# Patient Record
Sex: MALE | Race: WHITE | NOT HISPANIC OR LATINO | Employment: FULL TIME | URBAN - METROPOLITAN AREA
[De-identification: names, ages, dates, MRNs, and addresses within clinical notes are randomized per-mention and may not be internally consistent; named-entity substitution may affect disease eponyms.]

---

## 2023-02-08 PROBLEM — E78.5 HYPERLIPIDEMIA: Status: ACTIVE | Noted: 2023-02-08

## 2023-02-08 PROBLEM — E55.9 VITAMIN D DEFICIENCY: Status: ACTIVE | Noted: 2023-02-08

## 2023-02-08 PROBLEM — E11.9 TYPE 2 DIABETES MELLITUS (MULTI): Status: ACTIVE | Noted: 2023-02-08

## 2023-02-08 PROBLEM — E66.9 MILDLY OBESE: Status: ACTIVE | Noted: 2023-02-08

## 2023-02-08 PROBLEM — E78.1 ESSENTIAL HYPERTRIGLYCERIDEMIA: Status: ACTIVE | Noted: 2023-02-08

## 2023-02-08 PROBLEM — E83.39 HYPOPHOSPHATASIA: Status: ACTIVE | Noted: 2023-02-08

## 2023-02-08 PROBLEM — E03.9 HYPOTHYROIDISM: Status: ACTIVE | Noted: 2023-02-08

## 2023-02-08 PROBLEM — M67.449 MUCOUS CYST OF FINGER: Status: ACTIVE | Noted: 2023-02-08

## 2023-02-08 PROBLEM — E66.01 CLASS 2 SEVERE OBESITY WITH BODY MASS INDEX (BMI) OF 35 TO 39.9 WITH SERIOUS COMORBIDITY (MULTI): Status: ACTIVE | Noted: 2023-02-08

## 2023-02-08 RX ORDER — DAPAGLIFLOZIN 5 MG/1
5 TABLET, FILM COATED ORAL DAILY
COMMUNITY
End: 2023-03-21

## 2023-02-08 RX ORDER — METFORMIN HYDROCHLORIDE EXTENDED-RELEASE TABLETS 1000 MG/1
1000 TABLET, FILM COATED, EXTENDED RELEASE ORAL 2 TIMES DAILY
COMMUNITY

## 2023-02-08 RX ORDER — FENOFIBRATE 160 MG/1
160 TABLET ORAL DAILY
COMMUNITY
End: 2023-03-21

## 2023-02-08 RX ORDER — LEVOTHYROXINE SODIUM 100 UG/1
1 TABLET ORAL DAILY
COMMUNITY
Start: 2022-05-17 | End: 2023-03-21

## 2023-02-08 RX ORDER — CHOLECALCIFEROL (VITAMIN D3) 125 MCG
1 CAPSULE ORAL DAILY
COMMUNITY
Start: 2021-05-25

## 2023-02-08 RX ORDER — ATORVASTATIN CALCIUM 80 MG/1
80 TABLET, FILM COATED ORAL DAILY
COMMUNITY

## 2023-03-14 LAB
ALANINE AMINOTRANSFERASE (SGPT) (U/L) IN SER/PLAS: 31 U/L (ref 10–52)
ALBUMIN (G/DL) IN SER/PLAS: 4.3 G/DL (ref 3.4–5)
ALKALINE PHOSPHATASE (U/L) IN SER/PLAS: 24 U/L (ref 33–120)
ANION GAP IN SER/PLAS: 13 MMOL/L (ref 10–20)
ASPARTATE AMINOTRANSFERASE (SGOT) (U/L) IN SER/PLAS: 18 U/L (ref 9–39)
BILIRUBIN TOTAL (MG/DL) IN SER/PLAS: 0.5 MG/DL (ref 0–1.2)
CALCIUM (MG/DL) IN SER/PLAS: 9.2 MG/DL (ref 8.6–10.3)
CARBON DIOXIDE, TOTAL (MMOL/L) IN SER/PLAS: 25 MMOL/L (ref 21–32)
CHLORIDE (MMOL/L) IN SER/PLAS: 102 MMOL/L (ref 98–107)
CHOLESTEROL (MG/DL) IN SER/PLAS: 177 MG/DL (ref 0–199)
CHOLESTEROL IN HDL (MG/DL) IN SER/PLAS: 30.8 MG/DL
CHOLESTEROL/HDL RATIO: 5.7
CREATININE (MG/DL) IN SER/PLAS: 0.97 MG/DL (ref 0.5–1.3)
ESTIMATED AVERAGE GLUCOSE FOR HBA1C: 280 MG/DL
GFR MALE: >90 ML/MIN/1.73M2
GLUCOSE (MG/DL) IN SER/PLAS: 342 MG/DL (ref 74–99)
HEMOGLOBIN A1C/HEMOGLOBIN TOTAL IN BLOOD: 11.4 %
LDL: ABNORMAL MG/DL (ref 0–99)
POTASSIUM (MMOL/L) IN SER/PLAS: 4.1 MMOL/L (ref 3.5–5.3)
PROTEIN TOTAL: 7.2 G/DL (ref 6.4–8.2)
SODIUM (MMOL/L) IN SER/PLAS: 136 MMOL/L (ref 136–145)
THYROTROPIN (MIU/L) IN SER/PLAS BY DETECTION LIMIT <= 0.05 MIU/L: 11.08 MIU/L (ref 0.44–3.98)
THYROXINE (T4) FREE (NG/DL) IN SER/PLAS: 0.75 NG/DL (ref 0.61–1.12)
TRIGLYCERIDE (MG/DL) IN SER/PLAS: 608 MG/DL (ref 0–149)
UREA NITROGEN (MG/DL) IN SER/PLAS: 23 MG/DL (ref 6–23)
VLDL: ABNORMAL MG/DL (ref 0–40)

## 2023-03-21 ENCOUNTER — OFFICE VISIT (OUTPATIENT)
Dept: PRIMARY CARE | Facility: CLINIC | Age: 53
End: 2023-03-21
Payer: COMMERCIAL

## 2023-03-21 VITALS
RESPIRATION RATE: 14 BRPM | SYSTOLIC BLOOD PRESSURE: 148 MMHG | HEIGHT: 74 IN | HEART RATE: 82 BPM | WEIGHT: 263 LBS | BODY MASS INDEX: 33.75 KG/M2 | TEMPERATURE: 96.8 F | OXYGEN SATURATION: 96 % | DIASTOLIC BLOOD PRESSURE: 100 MMHG

## 2023-03-21 DIAGNOSIS — E55.9 VITAMIN D DEFICIENCY: ICD-10-CM

## 2023-03-21 DIAGNOSIS — E78.2 MIXED HYPERLIPIDEMIA: ICD-10-CM

## 2023-03-21 DIAGNOSIS — E03.9 HYPOTHYROIDISM, UNSPECIFIED TYPE: ICD-10-CM

## 2023-03-21 DIAGNOSIS — I10 BENIGN ESSENTIAL HYPERTENSION: ICD-10-CM

## 2023-03-21 DIAGNOSIS — E78.1 ESSENTIAL HYPERTRIGLYCERIDEMIA: ICD-10-CM

## 2023-03-21 DIAGNOSIS — E11.9 TYPE 2 DIABETES MELLITUS WITHOUT COMPLICATION, WITHOUT LONG-TERM CURRENT USE OF INSULIN (MULTI): Primary | ICD-10-CM

## 2023-03-21 DIAGNOSIS — Z12.5 SCREENING FOR PROSTATE CANCER: ICD-10-CM

## 2023-03-21 PROBLEM — E66.9 MILD OBESITY: Status: ACTIVE | Noted: 2023-03-21

## 2023-03-21 PROBLEM — E66.01 CLASS 2 SEVERE OBESITY WITH BODY MASS INDEX (BMI) OF 35 TO 39.9 WITH SERIOUS COMORBIDITY (MULTI): Status: RESOLVED | Noted: 2023-02-08 | Resolved: 2023-03-21

## 2023-03-21 PROCEDURE — 1036F TOBACCO NON-USER: CPT | Performed by: FAMILY MEDICINE

## 2023-03-21 PROCEDURE — 3008F BODY MASS INDEX DOCD: CPT | Performed by: FAMILY MEDICINE

## 2023-03-21 PROCEDURE — 4010F ACE/ARB THERAPY RXD/TAKEN: CPT | Performed by: FAMILY MEDICINE

## 2023-03-21 PROCEDURE — 99214 OFFICE O/P EST MOD 30 MIN: CPT | Performed by: FAMILY MEDICINE

## 2023-03-21 PROCEDURE — 3077F SYST BP >= 140 MM HG: CPT | Performed by: FAMILY MEDICINE

## 2023-03-21 PROCEDURE — 3046F HEMOGLOBIN A1C LEVEL >9.0%: CPT | Performed by: FAMILY MEDICINE

## 2023-03-21 PROCEDURE — 3080F DIAST BP >= 90 MM HG: CPT | Performed by: FAMILY MEDICINE

## 2023-03-21 RX ORDER — FENOFIBRATE 200 MG/1
200 CAPSULE ORAL
Qty: 30 CAPSULE | Refills: 11 | Status: SHIPPED | OUTPATIENT
Start: 2023-03-21 | End: 2024-03-20

## 2023-03-21 RX ORDER — LISINOPRIL 10 MG/1
10 TABLET ORAL DAILY
Qty: 30 TABLET | Refills: 3 | Status: SHIPPED | OUTPATIENT
Start: 2023-03-21 | End: 2023-07-19

## 2023-03-21 RX ORDER — DAPAGLIFLOZIN 10 MG/1
10 TABLET, FILM COATED ORAL DAILY
Qty: 30 TABLET | Refills: 11 | Status: SHIPPED | OUTPATIENT
Start: 2023-03-21 | End: 2024-03-20

## 2023-03-21 RX ORDER — GLIPIZIDE 5 MG/1
5 TABLET ORAL DAILY
Qty: 30 TABLET | Refills: 3 | Status: SHIPPED | OUTPATIENT
Start: 2023-03-21 | End: 2023-07-19

## 2023-03-21 RX ORDER — LEVOTHYROXINE SODIUM 125 UG/1
125 TABLET ORAL DAILY
Qty: 30 TABLET | Refills: 3 | Status: SHIPPED | OUTPATIENT
Start: 2023-03-21 | End: 2023-07-19

## 2023-03-21 ASSESSMENT — ENCOUNTER SYMPTOMS
CHEST TIGHTNESS: 0
PALPITATIONS: 0
SHORTNESS OF BREATH: 0
FEVER: 0
ARTHRALGIAS: 0
ABDOMINAL PAIN: 0
CHILLS: 0
CONFUSION: 0

## 2023-03-21 NOTE — ASSESSMENT & PLAN NOTE
BP is not to goal start lisinopril 10 mg daily return in approximately 6 weeks for blood pressure recheck

## 2023-03-21 NOTE — ASSESSMENT & PLAN NOTE
Triglycerides are drifting back up now at 608.  Discontinue fenofibrate 160 mg we will increase this to fenofibrate 200 mg once a day and we reviewed lifestyle modifications

## 2023-03-21 NOTE — ASSESSMENT & PLAN NOTE
>>ASSESSMENT AND PLAN FOR MIXED HYPERLIPIDEMIA WRITTEN ON 3/21/2023  7:38 PM BY NEHA DIXON MD    Total cholesterol is under 200 LDL cannot be calculated due to abnormally high triglycerides.  Most recent LDL was below 100 and in the 70s.  We will continue atorvastatin 80 mg a day for now.  Given his hyperlipidemia and hypertriglyceridemia history along with multiple other cardiovascular risk factors we discussed further cardiac evaluation patient agreed to do a CT calcium scoring test further plans pending those results.    >>ASSESSMENT AND PLAN FOR ESSENTIAL HYPERTRIGLYCERIDEMIA WRITTEN ON 3/21/2023  7:36 PM BY NEHA DIXON MD    Triglycerides are drifting back up now at 608.  Discontinue fenofibrate 160 mg we will increase this to fenofibrate 200 mg once a day and we reviewed lifestyle modifications

## 2023-03-21 NOTE — PROGRESS NOTES
"Subjective   Patient ID: Mohan Dillon is a 52 y.o. male who presents for Follow-up (6 week).    HPI patient today for follow-up of ongoing healthcare issues and review of recent lab work.  Patient states he has been taking his medicines consistently and trying to follow a proper diet.    Review of Systems   Constitutional:  Negative for chills and fever.   HENT:  Negative for congestion and ear pain.    Eyes:  Negative for visual disturbance.   Respiratory:  Negative for chest tightness and shortness of breath.    Cardiovascular:  Negative for chest pain and palpitations.   Gastrointestinal:  Negative for abdominal pain.   Musculoskeletal:  Negative for arthralgias.   Skin:  Negative for pallor.   Psychiatric/Behavioral:  Negative for confusion.        Objective   BP (!) 148/100 (BP Location: Right arm, Patient Position: Sitting)   Pulse 82   Temp 36 °C (96.8 °F)   Resp 14   Ht 1.88 m (6' 2\")   Wt 119 kg (263 lb)   SpO2 96%   BMI 33.77 kg/m²     Physical Exam  Vitals and nursing note reviewed.   Constitutional:       General: He is not in acute distress.     Appearance: Normal appearance. He is not ill-appearing.   HENT:      Head: Normocephalic and atraumatic.      Right Ear: Tympanic membrane, ear canal and external ear normal.      Left Ear: Tympanic membrane, ear canal and external ear normal.      Mouth/Throat:      Pharynx: Oropharynx is clear.   Eyes:      Extraocular Movements: Extraocular movements intact.   Cardiovascular:      Rate and Rhythm: Normal rate and regular rhythm.      Pulses: Normal pulses.      Heart sounds: Normal heart sounds.   Pulmonary:      Effort: Pulmonary effort is normal.      Breath sounds: Normal breath sounds.   Abdominal:      General: Abdomen is flat. Bowel sounds are normal.      Palpations: Abdomen is soft.      Tenderness: There is no abdominal tenderness.   Musculoskeletal:         General: Normal range of motion.      Cervical back: Neck supple.   Skin:     General: " Skin is warm.   Neurological:      Mental Status: He is alert and oriented to person, place, and time. Mental status is at baseline.   Psychiatric:         Mood and Affect: Mood normal.       Return to office 6 weeks for blood pressure recheck  Return to office 12 weeks with repeat fasting labs and reassessment of ongoing healthcare issues  CT cardiac scoring test ordered given patient's multiple risk factors.  Assessment/Plan   Problem List Items Addressed This Visit       Vitamin D deficiency     Continue to monitor supplement as needed         Relevant Orders    Vitamin D 1,25 Dihydroxy    Type 2 diabetes mellitus (CMS/[facility]) - Primary     A1c 11.08% although this is actually slightly improved still far from goal.  Continue the metformin increase Farxiga to 10 mg daily and we discussed other additional meds that could be added.  At this point he wants to avoid injectable medication and try to keep costs down.  As result we will add Glucotrol 5 mg once every a.m.  Diabetic diet reviewed.         Relevant Medications    dapagliflozin (Farxiga) 10 mg    glipiZIDE (Glucotrol) 5 mg tablet    Other Relevant Orders    CBC    Albumin , Urine Random    Hemoglobin A1C    CT cardiac scoring wo IV contrast    Follow Up In Primary Care    Hypothyroidism     TSH is elevated increase levothyroxine to 125 mcg daily we also reviewed proper way to take this medicine         Relevant Medications    levothyroxine (Synthroid, Levoxyl) 125 mcg tablet    Other Relevant Orders    TSH with reflex to Free T4 if abnormal    CT cardiac scoring wo IV contrast    Follow Up In Primary Care    Hyperlipidemia     Total cholesterol is under 200 LDL cannot be calculated due to abnormally high triglycerides.  Most recent LDL was below 100 and in the 70s.  We will continue atorvastatin 80 mg a day for now.  Given his hyperlipidemia and hypertriglyceridemia history along with multiple other cardiovascular risk factors we discussed further cardiac  evaluation patient agreed to do a CT calcium scoring test further plans pending those results.         Relevant Orders    CBC    Comprehensive Metabolic Panel    Lipid Panel    CT cardiac scoring wo IV contrast    Follow Up In Primary Care    Essential hypertriglyceridemia     Triglycerides are drifting back up now at 608.  Discontinue fenofibrate 160 mg we will increase this to fenofibrate 200 mg once a day and we reviewed lifestyle modifications         Relevant Medications    fenofibrate micronized (Lofibra) 200 mg capsule    Other Relevant Orders    Comprehensive Metabolic Panel    Lipid Panel    CT cardiac scoring wo IV contrast    Follow Up In Primary Care    Screening for prostate cancer     Check PSA with next lab draw         Relevant Orders    Prostate Specific Antigen, Screen    Benign essential hypertension     BP is not to goal start lisinopril 10 mg daily return in approximately 6 weeks for blood pressure recheck         Relevant Medications    lisinopril 10 mg tablet    Other Relevant Orders    Follow Up In Primary Care    BMI 33.0-33.9,adult     We reviewed lifestyle modifications with regards to diet and weight loss

## 2023-03-21 NOTE — ASSESSMENT & PLAN NOTE
TSH is elevated increase levothyroxine to 125 mcg daily we also reviewed proper way to take this medicine

## 2023-03-21 NOTE — ASSESSMENT & PLAN NOTE
A1c 11.08% although this is actually slightly improved still far from goal.  Continue the metformin increase Farxiga to 10 mg daily and we discussed other additional meds that could be added.  At this point he wants to avoid injectable medication and try to keep costs down.  As result we will add Glucotrol 5 mg once every a.m.  Diabetic diet reviewed.

## 2023-05-02 ENCOUNTER — APPOINTMENT (OUTPATIENT)
Dept: PRIMARY CARE | Facility: CLINIC | Age: 53
End: 2023-05-02
Payer: COMMERCIAL

## 2023-06-22 ENCOUNTER — APPOINTMENT (OUTPATIENT)
Dept: PRIMARY CARE | Facility: CLINIC | Age: 53
End: 2023-06-22
Payer: COMMERCIAL

## 2023-09-26 ENCOUNTER — APPOINTMENT (OUTPATIENT)
Dept: PRIMARY CARE | Facility: CLINIC | Age: 53
End: 2023-09-26
Payer: COMMERCIAL

## 2023-10-31 ENCOUNTER — APPOINTMENT (OUTPATIENT)
Dept: PRIMARY CARE | Facility: CLINIC | Age: 53
End: 2023-10-31
Payer: COMMERCIAL

## 2023-12-04 ENCOUNTER — APPOINTMENT (OUTPATIENT)
Dept: PRIMARY CARE | Facility: CLINIC | Age: 53
End: 2023-12-04
Payer: COMMERCIAL

## 2024-01-08 ENCOUNTER — APPOINTMENT (OUTPATIENT)
Dept: PRIMARY CARE | Facility: CLINIC | Age: 54
End: 2024-01-08
Payer: COMMERCIAL

## 2024-02-07 ENCOUNTER — APPOINTMENT (OUTPATIENT)
Dept: PRIMARY CARE | Facility: CLINIC | Age: 54
End: 2024-02-07
Payer: COMMERCIAL

## 2024-03-12 ENCOUNTER — APPOINTMENT (OUTPATIENT)
Dept: PRIMARY CARE | Facility: CLINIC | Age: 54
End: 2024-03-12
Payer: COMMERCIAL

## 2024-05-15 ENCOUNTER — APPOINTMENT (OUTPATIENT)
Dept: PRIMARY CARE | Facility: CLINIC | Age: 54
End: 2024-05-15
Payer: COMMERCIAL

## 2024-07-11 ENCOUNTER — APPOINTMENT (OUTPATIENT)
Dept: PRIMARY CARE | Facility: CLINIC | Age: 54
End: 2024-07-11
Payer: COMMERCIAL

## 2024-07-11 VITALS
SYSTOLIC BLOOD PRESSURE: 116 MMHG | OXYGEN SATURATION: 98 % | RESPIRATION RATE: 12 BRPM | WEIGHT: 232 LBS | HEART RATE: 88 BPM | DIASTOLIC BLOOD PRESSURE: 78 MMHG | BODY MASS INDEX: 29.77 KG/M2 | HEIGHT: 74 IN

## 2024-07-11 DIAGNOSIS — E03.9 HYPOTHYROIDISM, UNSPECIFIED TYPE: ICD-10-CM

## 2024-07-11 DIAGNOSIS — E11.9 TYPE 2 DIABETES MELLITUS WITHOUT COMPLICATION, WITHOUT LONG-TERM CURRENT USE OF INSULIN (MULTI): Primary | ICD-10-CM

## 2024-07-11 DIAGNOSIS — I10 BENIGN ESSENTIAL HYPERTENSION: ICD-10-CM

## 2024-07-11 DIAGNOSIS — E78.2 MIXED HYPERLIPIDEMIA: ICD-10-CM

## 2024-07-11 PROBLEM — E66.9 MILDLY OBESE: Status: RESOLVED | Noted: 2023-02-08 | Resolved: 2024-07-11

## 2024-07-11 PROBLEM — E66.9 MILD OBESITY: Status: RESOLVED | Noted: 2023-03-21 | Resolved: 2024-07-11

## 2024-07-11 PROBLEM — E83.39 HYPOPHOSPHATASIA: Status: RESOLVED | Noted: 2023-02-08 | Resolved: 2024-07-11

## 2024-07-11 PROBLEM — Z12.5 SCREENING FOR PROSTATE CANCER: Status: RESOLVED | Noted: 2023-03-21 | Resolved: 2024-07-11

## 2024-07-11 PROCEDURE — 3078F DIAST BP <80 MM HG: CPT | Performed by: FAMILY MEDICINE

## 2024-07-11 PROCEDURE — 3074F SYST BP LT 130 MM HG: CPT | Performed by: FAMILY MEDICINE

## 2024-07-11 PROCEDURE — 99214 OFFICE O/P EST MOD 30 MIN: CPT | Performed by: FAMILY MEDICINE

## 2024-07-11 PROCEDURE — 1036F TOBACCO NON-USER: CPT | Performed by: FAMILY MEDICINE

## 2024-07-11 NOTE — PROGRESS NOTES
"Subjective   Patient ID: Mohan Dillon is a 53 y.o. male who presents for New Patient Visit.    HPI   Initial visit.    Has DM, HLD, HTN, Hypothyroidism.  Last MD visit > 1 yr ago.  No meds x 3 months.  31 lb weight loss since last MD visit (diet, exercise).  Requests refill of Synthroid.  Requests labs to see if he needs to restart other meds.    Reviewed/Updated Active problem list, PMH, PSH, FH, SH, Meds, Allergies.     Review of Systems  No other complaints.     Objective   /78   Pulse 88   Resp 12   Ht 1.88 m (6' 2\")   Wt 105 kg (232 lb)   SpO2 98%   BMI 29.79 kg/m²     Physical Exam  Constitutional:       General: He is not in acute distress.     Appearance: He is overweight.   Cardiovascular:      Rate and Rhythm: Normal rate and regular rhythm.      Heart sounds: Normal heart sounds. No murmur heard.     No friction rub. No gallop.   Pulmonary:      Effort: Pulmonary effort is normal.      Breath sounds: Normal breath sounds. No wheezing, rhonchi or rales.   Neurological:      Mental Status: He is oriented to person, place, and time.   Psychiatric:         Mood and Affect: Mood normal.         Behavior: Behavior normal.     Assessment/Plan   Diagnoses and all orders for this visit:  Type 2 diabetes mellitus without complication, without long-term current use of insulin (Multi)  -     Hemoglobin A1C; Future  Hypothyroidism, unspecified type  -     Thyroid Stimulating Hormone; Future  Mixed hyperlipidemia  -     Lipid Panel; Future  Benign essential hypertension  -     Basic Metabolic Panel; Future    Fasting labs.  Will restart meds if indicated after reviewing lab results.    F/U 3 months: Annual wellness visit.  "

## 2024-07-11 NOTE — PATIENT INSTRUCTIONS
Fasting labs.  Will restart meds if indicated after reviewing lab results.    F/U 3 months: Annual wellness visit.    Lab services: [address]  Hours: M-F 6:30a-6p, Sat 8a-12p  Phone: [phone], [address]

## 2024-07-12 ENCOUNTER — LAB (OUTPATIENT)
Dept: LAB | Facility: LAB | Age: 54
End: 2024-07-12
Payer: COMMERCIAL

## 2024-07-12 DIAGNOSIS — E11.9 TYPE 2 DIABETES MELLITUS WITHOUT COMPLICATION, WITHOUT LONG-TERM CURRENT USE OF INSULIN (MULTI): ICD-10-CM

## 2024-07-12 DIAGNOSIS — E78.2 MIXED HYPERLIPIDEMIA: ICD-10-CM

## 2024-07-12 DIAGNOSIS — E03.9 HYPOTHYROIDISM, UNSPECIFIED TYPE: ICD-10-CM

## 2024-07-12 DIAGNOSIS — I10 BENIGN ESSENTIAL HYPERTENSION: ICD-10-CM

## 2024-07-12 LAB
ANION GAP SERPL CALC-SCNC: 15 MMOL/L (ref 10–20)
BUN SERPL-MCNC: 14 MG/DL (ref 6–23)
CALCIUM SERPL-MCNC: 9.3 MG/DL (ref 8.6–10.3)
CHLORIDE SERPL-SCNC: 101 MMOL/L (ref 98–107)
CHOLEST SERPL-MCNC: 323 MG/DL (ref 0–199)
CHOLESTEROL/HDL RATIO: 9
CO2 SERPL-SCNC: 23 MMOL/L (ref 21–32)
CREAT SERPL-MCNC: 0.94 MG/DL (ref 0.5–1.3)
EGFRCR SERPLBLD CKD-EPI 2021: >90 ML/MIN/1.73M*2
EST. AVERAGE GLUCOSE BLD GHB EST-MCNC: 358 MG/DL
GLUCOSE SERPL-MCNC: 366 MG/DL (ref 74–99)
HBA1C MFR BLD: 14.1 %
HDLC SERPL-MCNC: 35.9 MG/DL
LDLC SERPL CALC-MCNC: ABNORMAL MG/DL
NON HDL CHOLESTEROL: 287 MG/DL (ref 0–149)
POTASSIUM SERPL-SCNC: 4.6 MMOL/L (ref 3.5–5.3)
SODIUM SERPL-SCNC: 134 MMOL/L (ref 136–145)
TRIGL SERPL-MCNC: 778 MG/DL (ref 0–149)
TSH SERPL-ACNC: 6.81 MIU/L (ref 0.44–3.98)
VLDL: ABNORMAL

## 2024-07-12 PROCEDURE — 36415 COLL VENOUS BLD VENIPUNCTURE: CPT

## 2024-07-12 PROCEDURE — 80061 LIPID PANEL: CPT

## 2024-07-12 PROCEDURE — 83036 HEMOGLOBIN GLYCOSYLATED A1C: CPT

## 2024-07-12 PROCEDURE — 80048 BASIC METABOLIC PNL TOTAL CA: CPT

## 2024-07-12 PROCEDURE — 84443 ASSAY THYROID STIM HORMONE: CPT

## 2024-07-14 DIAGNOSIS — I10 BENIGN ESSENTIAL HYPERTENSION: ICD-10-CM

## 2024-07-14 DIAGNOSIS — E11.9 TYPE 2 DIABETES MELLITUS WITHOUT COMPLICATION, WITHOUT LONG-TERM CURRENT USE OF INSULIN (MULTI): ICD-10-CM

## 2024-07-14 DIAGNOSIS — E78.2 MIXED DYSLIPIDEMIA: Primary | ICD-10-CM

## 2024-07-14 DIAGNOSIS — E03.9 HYPOTHYROIDISM, UNSPECIFIED TYPE: ICD-10-CM

## 2024-07-14 RX ORDER — LEVOTHYROXINE SODIUM 125 UG/1
125 TABLET ORAL DAILY
Qty: 90 TABLET | Refills: 0 | Status: SHIPPED | OUTPATIENT
Start: 2024-07-14

## 2024-07-14 RX ORDER — METFORMIN HYDROCHLORIDE EXTENDED-RELEASE TABLETS 1000 MG/1
1000 TABLET, FILM COATED, EXTENDED RELEASE ORAL 2 TIMES DAILY
Qty: 180 TABLET | Refills: 0 | Status: SHIPPED | OUTPATIENT
Start: 2024-07-14

## 2024-07-14 RX ORDER — FENOFIBRATE 200 MG/1
200 CAPSULE ORAL
Qty: 90 CAPSULE | Refills: 0 | Status: SHIPPED | OUTPATIENT
Start: 2024-07-14

## 2024-07-14 RX ORDER — LISINOPRIL 2.5 MG/1
2.5 TABLET ORAL DAILY
Qty: 90 TABLET | Refills: 0 | Status: SHIPPED | OUTPATIENT
Start: 2024-07-14

## 2024-07-14 RX ORDER — ATORVASTATIN CALCIUM 80 MG/1
80 TABLET, FILM COATED ORAL DAILY
Qty: 90 TABLET | Refills: 0 | Status: SHIPPED | OUTPATIENT
Start: 2024-07-14

## 2024-07-14 RX ORDER — DAPAGLIFLOZIN 10 MG/1
10 TABLET, FILM COATED ORAL DAILY
Qty: 90 TABLET | Refills: 0 | Status: SHIPPED | OUTPATIENT
Start: 2024-07-14

## 2024-07-14 RX ORDER — GLIPIZIDE 10 MG/1
10 TABLET ORAL
Qty: 180 TABLET | Refills: 0 | Status: SHIPPED | OUTPATIENT
Start: 2024-07-14

## 2024-08-12 ENCOUNTER — HOSPITAL ENCOUNTER (OUTPATIENT)
Dept: RADIOLOGY | Facility: HOSPITAL | Age: 54
Discharge: HOME | End: 2024-08-12
Payer: COMMERCIAL

## 2024-08-12 DIAGNOSIS — E78.2 MIXED DYSLIPIDEMIA: ICD-10-CM

## 2024-08-12 DIAGNOSIS — I10 BENIGN ESSENTIAL HYPERTENSION: ICD-10-CM

## 2024-08-12 DIAGNOSIS — E11.9 TYPE 2 DIABETES MELLITUS WITHOUT COMPLICATION, WITHOUT LONG-TERM CURRENT USE OF INSULIN (MULTI): ICD-10-CM

## 2024-08-12 PROCEDURE — 75571 CT HRT W/O DYE W/CA TEST: CPT

## 2024-08-17 DIAGNOSIS — R93.1 ELEVATED CORONARY ARTERY CALCIUM SCORE: Primary | ICD-10-CM

## 2024-10-15 ENCOUNTER — APPOINTMENT (OUTPATIENT)
Dept: PRIMARY CARE | Facility: CLINIC | Age: 54
End: 2024-10-15
Payer: COMMERCIAL